# Patient Record
Sex: MALE | Race: WHITE
[De-identification: names, ages, dates, MRNs, and addresses within clinical notes are randomized per-mention and may not be internally consistent; named-entity substitution may affect disease eponyms.]

---
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
## 2023-08-04 ENCOUNTER — HOSPITAL ENCOUNTER (EMERGENCY)
Dept: HOSPITAL 75 - ER FS | Age: 18
Discharge: HOME | End: 2023-08-04
Payer: COMMERCIAL

## 2023-08-04 VITALS — DIASTOLIC BLOOD PRESSURE: 71 MMHG | SYSTOLIC BLOOD PRESSURE: 117 MMHG

## 2023-08-04 VITALS — WEIGHT: 165.35 LBS | BODY MASS INDEX: 21.91 KG/M2 | HEIGHT: 72.83 IN
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
## 2023-08-04 DIAGNOSIS — N50.89: ICD-10-CM

## 2023-08-04 DIAGNOSIS — S30.22XA: Primary | ICD-10-CM
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
## 2023-08-04 LAB
APTT PPP: YELLOW S
BACTERIA #/AREA URNS HPF: NEGATIVE /HPF
BILIRUB UR QL STRIP: NEGATIVE
FIBRINOGEN PPP-MCNC: CLEAR MG/DL
GLUCOSE UR STRIP-MCNC: NEGATIVE MG/DL
KETONES UR QL STRIP: NEGATIVE
LEUKOCYTE ESTERASE UR QL STRIP: NEGATIVE
NITRITE UR QL STRIP: NEGATIVE
PH UR STRIP: 7.5 [PH] (ref 5–9)
PROT UR QL STRIP: NEGATIVE
RBC #/AREA URNS HPF: (no result) /HPF
SP GR UR STRIP: 1 (ref 1.02–1.02)
SQUAMOUS #/AREA URNS HPF: (no result) /HPF
WBC #/AREA URNS HPF: (no result) /HPF

## 2023-08-04 PROCEDURE — 76870 US EXAM SCROTUM: CPT

## 2023-08-04 PROCEDURE — 81000 URINALYSIS NONAUTO W/SCOPE: CPT

## 2023-08-04 NOTE — ED GU-MALE
General


Chief Complaint:  General Problems/Pain


Stated Complaint:  GENITAL INJ/SWELLING





History of Present Illness


Date Seen by Provider:  Aug 4, 2023


Time Seen by Provider:  16:00


Initial Comments


18-year-old male presents with scrotal/testicular injury.  Patient was involved 

in a bareback riding in a rodeo yesterday when he suffered the injury.  He comes

in today because he is having significant pain swelling and discoloration of his

left testicle and scrotal area.


 (VANESSA ANDERSON DO)





Allergies and Home Medications


Allergies


Coded Allergies:  


     Penicillins (Verified  Allergy, Intermediate, Vomiting, 10/20/21)





Patient Home Medication List


Home Medication List Reviewed:  Yes


 (VANESSA ANDERSON DO)


Hydrocodone/Acetaminophen (Hydrocodone-Acetamin 5-325 mg) 1 Each Tablet, 1 TAB 

PO Q4H PRN for PAIN-MODERATE (5-7)


   Prescribed by: MARIOLA GRIMES on 12/6/21 1328


Oxycodone HCl (Oxycodone HCl) 5 Mg Tablet, 5 MG PO Q4H PRN for PAIN-MODERATE (5-

7)


   Prescribed by: DEVIN MIRANDA MD on 11/1/21 0938





Review of Systems


Review of Systems


Constitutional:  no symptoms reported


EENTM:  no symptoms reported


Respiratory:  no symptoms reported


Gastrointestinal:  no symptoms reported


Genitourinary:  see HPI, pain


Musculoskeletal:  no symptoms reported


Skin:  no symptoms reported


Psychiatric/Neurological:  No Symptoms Reported (VANESSA ANDERSON DO)





Past Medical-Social-Family Hx


Patient Social History


Tobacco Use?:  No


Use of E-Cig and/or Vaping dev:  No


Substance use?:  No


Alcohol Use?:  No


Pt feels they are or have been:  No


 (VANESSA ANDERSON DO)





Seasonal Allergies


Seasonal Allergies:  No


 (VANESSA ANDERSON DO)





Past Medical History


Surgeries:  No


Respiratory:  No


Currently Using CPAP:  No


Currently Using BIPAP:  No


Cardiac:  No


Neurological:  No


Genitourinary:  No


Gastrointestinal:  No


Musculoskeletal:  Yes (PREVIOUS CLOSED REDUCTION OF LEFT FOREARM FX)


Fractures


Endocrine:  No


HEENT:  No


Cancer:  No


Psychosocial:  No


Integumentary:  No


Blood Disorders:  No


 (VANESSA ANDERSON DO)





Physical Exam


Vital Signs





Vital Signs - First Documented








 8/4/23





 15:56


 


Temp 36.7


 


Pulse 55


 


Resp 16


 


B/P (MAP) 124/57 (79)


 


Pulse Ox 100


 


O2 Delivery Room Air





 (BECKY AUSTIN)


Vital Signs


Capillary Refill :  


 (ANDERSON,VANESSA L DO)


Height, Weight, BMI


Height: '"


Weight: lbs. oz. kg; 19.00 BMI


Method:


General Appearance:  WD/WN, no apparent distress


Respiratory:  lungs clear, normal breath sounds


Gastrointestinal:  non tender, soft


Male:  testicular tenderness, other (Scrotum with significant contusion swelling

and tenderness in the testicle)


Extremities:  normal range of motion, non-tender


Neurologic/Psychiatric:  alert, normal mood/affect, oriented x 3


Skin:  ecchymosis (Scrotum) (ANDERSON,VANESSA L DO)





Progress/Results/Core Measures


Suspected Sepsis


SIRS


Temperature: 


Pulse:  


Respiratory Rate: 


 


Blood Pressure  / 


Mean: 


 


 (ANDERSON,VANESSA L DO)





Results/Orders


Lab Results





Laboratory Tests








Test


 8/4/23


17:08 Range/Units


 


 


Urine Color YELLOW   


 


Urine Clarity CLEAR   


 


Urine pH 7.5  5-9  


 


Urine Specific Gravity 1.005 L 1.016-1.022  


 


Urine Protein NEGATIVE  NEGATIVE  


 


Urine Glucose (UA) NEGATIVE  NEGATIVE  


 


Urine Ketones NEGATIVE  NEGATIVE  


 


Urine Nitrite NEGATIVE  NEGATIVE  


 


Urine Bilirubin NEGATIVE  NEGATIVE  


 


Urine Urobilinogen 0.2  < = 1.0  MG/DL


 


Urine Leukocyte Esterase NEGATIVE  NEGATIVE  


 


Urine RBC (Auto) NEGATIVE  NEGATIVE  


 


Urine RBC NONE   /HPF


 


Urine WBC NONE   /HPF


 


Urine Squamous Epithelial


Cells NONE 


  /HPF





 


Urine Crystals NONE   /LPF


 


Urine Bacteria NEGATIVE   /HPF


 


Urine Casts NONE   /LPF


 


Urine Mucus NEGATIVE   /LPF


 


Urine Culture Indicated NO   





 (BECKY AUSTIN)


My Orders





Orders - BECKY AUSTIN


Ua Culture If Indicated (8/4/23 16:59)


Us Scrotum (Testicle) [id number] (8/4/23 16:59)


 (BECKY AUSTIN)


Vital Signs/I&O











 8/4/23 8/4/23 8/4/23





 15:56 16:10 17:01


 


Temp 36.7 36.7 37.0


 


Pulse 55 55 51


 


Resp 16 16 16


 


B/P (MAP) 124/57 (79) 126/57 (80) 117/71 (86)


 


Pulse Ox 100 100 100


 


O2 Delivery Room Air Room Air 





 (BECKY AUSTIN)


Vital Signs/I&O


Capillary Refill :  


 (ANDERSONVANESSA L DO)





Departure


Communication (PCP)


Patient is a 18-year-old male who was transferred from [facility] for further

evaluation of scrotal injury.  Their was no ultrasound services at their 

facility was transferred to our facility for ultrasound rule out testicle or 

torsion.  Patient was bareback riding a horse yesterday at a rodeo when he came 

down awkwardly injuring his scrotum.  Patient noted some bruising and swelling 

to his left sided scrotum.  Reports pain at the time.  Pain appears to be 

improving noted increased bruising and swelling.  Urinating without any 

difficulties.  On arrival no acute distress.  Did order urinalysis which was 

unremarkable.  Ultrasound was ordered which did not show any evidence of 

testicular torsion.  No evidence of varicocele or hydrocele.  No testicular 

mass.  Concern for left scrotal soft tissue edema of or hematoma.  Due to not 

having any urology services consulted with urologist Dr. Mojica at 

[facility] regarding patient's results.  At this time recommended 

conservative treatment ice, anti-inflammatories, supportive underwear.  

Recommend following up with urology at [facility] next week.  If any increasing pain,

difficulty urinating to return back to ED.  Patient agrees with plan of action.


 (BECKY AUSTIN)





Impression





   Primary Impression:  


   Contusion of scrotum and testes, initial encounter


   Additional Impression:  


   Scrotal edema


Disposition:  01 HOME, SELF-CARE


Condition:  Stable





Departure-Patient Inst.


Decision time for Depature:  19:05


 (BECKY AUSTIN)


Referrals:  


[facility]





NO,LOCAL PHYSICIAN (PCP)


Primary Care Physician


Patient Instructions:  Testicular Injury





Add. Discharge Instructions:  


Please keep scrotum elevated, ice, Tylenol ibuprofen as needed for discomfort.





Recommend following up with [facility] urology at [phone].  Anti-inflammatories 

for pain.  Ice and rest





All discharge instructions reviewed with patient and/or family. Voiced 

understanding.











VANESSA ANDERSON DO                Aug 4, 2023 16:02


BECKY AUSTIN           Aug 4, 2023 17:06

## 2023-08-04 NOTE — DIAGNOSTIC IMAGING REPORT
EXAMINATION: US scrotum w/duplex.



TECHNIQUE: Multiple realtime gray images were obtained of the

scrotum in various projections bilaterally. Color Doppler images

were also obtained.



HISTORY: Left testicle pain.



COMPARISON: None available.



FINDINGS: The right testis has a homogeneous echogenic appearance

without intratesticular mass or hyperemia, and measures 4.5 x 2.2

x 2.7 cm. The right epididymis is normal. No extratesticular

mass. No hydrocele or varicocele.



The left testis has a homogeneous echogenic appearance without

intratesticular mass or hyperemia, and measures 4.6 x 2.2 x 2.8

cm. The left epididymis is normal. No extratesticular mass. No

hydrocele or varicocele.



Color and pulsed Doppler imaging demonstrates symmetric, flow

with normal arterial waveforms obtained from each testis.



There is abnormal thickening of the left scrotal soft tissues.



IMPRESSION:

1. Unremarkable appearance of the bilateral testes.

2. Abnormal thickening of the left scrotal soft tissues can be

seen with edema or hematoma. 



Dictated by: 



  Dictated on workstation # [id number]